# Patient Record
(demographics unavailable — no encounter records)

---

## 2024-10-31 NOTE — REVIEW OF SYSTEMS
[Negative] : Heme/Lymph [de-identified] : as per HPI  [de-identified] : as per HPI  [de-identified] : as per HPI

## 2024-10-31 NOTE — REASON FOR VISIT
[Initial Evaluation] : an initial evaluation for [Mother] : mother [[employer] ] : provided by [employer]   [FreeTextEntry2] : nasal congestion

## 2024-10-31 NOTE — HISTORY OF PRESENT ILLNESS
[No Personal or Family History of Easy Bruising, Bleeding, or Issues with General Anesthesia] : No Personal or Family History of easy bruising, bleeding, or issues with general anesthesia [de-identified] : 10-31-24 7 year old boy presents with nasal congestion and difficulty breathing for 2 months  History of Asthma- using albuterol PRN  Mom states patient gets sick frequently, when his congestion is bad he occasionally gets treated with prednisone  No currently using nasal sprays or steroids sprays  Reports intermittent mouth breathing and mild snoring when severely congested  No pauses, gasping or choking while sleeping  No bed wetting or sleep study conducted  (+) anterior rhinorrhea  Recent viral infection 3 weeks ago- treated with albuterol and prednisone.  Patient denies otalgia, otorrhea, ear infections, bleeding, hearing loss, ear surgeries, tinnitus, dizziness, vertigo, headaches related to hearing.  Denies epistaxis.  +snoring. unsure if apneas or pauses restless sleeper very tired during the day.  Current URI Previous left ear pit removal. still has one on the right.

## 2024-10-31 NOTE — ASSESSMENT
[FreeTextEntry1] : 7 year male Nasal congestion and but only  mild adenoid hypertrophy. Discussed medical vs surgical therapy  Discussed with parent on nasal saline/irrigations. If doing irrigations, recommend using distilled water and doing in shower twice a day at minimum. Use 0.9% and not hypertonic and slightly warm up to improve discomfort with irrigation. No other irrigation medications at this time. This will attempt to remove mucus and irritants/allergens.    Discussed at length regarding in home allergens and irritants. Avoiding smoke and pets, especially in the bedroom. Remove any carpeting in the bedroom and no stuffed animals.  Wash sheets in hot water once per week.  Hypoallergenic covers for bedding and pillows.  Consider HEPA filter.  Consider allergy referral.  Flonase trial for at least 3 weeks. Rx sent.  Discussed with the parent regarding sleep observation by going into their kids room a few times a week and watch them sleep for 5-10 min at varying times of the night to monitor snoring, apneas or gasping, signs of struggling to breath, restlessness, or other signs of SDB.  Sometimes we consider ordering a sleep study if highly concerned. Can discuss findings at next appointment.  OME today and wet cough. Likely viral.  Recheck at next visit.   Right pre-auricular pit.  Audio done.  Left previous ear pit removal.  Audio WNL  RTC 3-4 months

## 2024-10-31 NOTE — CONSULT LETTER
[Dear  ___] : Dear  [unfilled], [Consult Letter:] : I had the pleasure of evaluating your patient, [unfilled]. [Please see my note below.] : Please see my note below. [Consult Closing:] : Thank you very much for allowing me to participate in the care of this patient.  If you have any questions, please do not hesitate to contact me. [Sincerely,] : Sincerely, [FreeTextEntry2] : Marcell Berkowitz MD  [FreeTextEntry3] : Stalin Dumont MD  Pediatric Otolaryngology/ Head & Neck Surgery  [facility]  [address] [place] [address]  Tel [phone]  Fax [phone]

## 2024-10-31 NOTE — PHYSICAL EXAM
[2+] : 2+ [Normal Gait and Station] : normal gait and station [Normal muscle strength, symmetry and tone of facial, head and neck musculature] : normal muscle strength, symmetry and tone of facial, head and neck musculature [Normal] : no cervical lymphadenopathy [Exposed Vessel] : left anterior vessel not exposed [Wheezing] : no wheezing [Increased Work of Breathing] : no increased work of breathing with use of accessory muscles and retractions [FreeTextEntry6] : pre-auricular pit [FreeTextEntry7] : scar near previous ear pit.  [de-identified] : OME [de-identified] : wet cough [de-identified] : fearful, crying

## 2025-02-20 NOTE — REVIEW OF SYSTEMS
[Negative] : Heme/Lymph [de-identified] : as per HPI  [de-identified] : as per HPI  [de-identified] : as per HPI

## 2025-02-20 NOTE — REASON FOR VISIT
[Subsequent Evaluation] : a subsequent evaluation for [Mother] : mother [[employer] ] : provided by [employer]   [Nasal Obstruction] : nasal obstruction [Nasal Discharge] : nasal discharge [Sleep Apnea/ Snoring] : sleep apnea/ snoring [FreeTextEntry2] : nasal congestion

## 2025-02-20 NOTE — CONSULT LETTER
[Dear  ___] : Dear  [unfilled], [Consult Letter:] : I had the pleasure of evaluating your patient, [unfilled]. [Please see my note below.] : Please see my note below. [Consult Closing:] : Thank you very much for allowing me to participate in the care of this patient.  If you have any questions, please do not hesitate to contact me. [Sincerely,] : Sincerely, [FreeTextEntry2] : Marcell Berkowitz MD  [FreeTextEntry3] : Stalin Dumont MD  Pediatric Otolaryngology/ Head & Neck Surgery  [facility]  [address] [place] [address]  Tel [phone]  Fax [phone]

## 2025-02-20 NOTE — ASSESSMENT
[FreeTextEntry1] : 8 year M with continued nasal congestion with mild improvement on nasal steroid. Will plan to continue nasal steroid and begin nasal irrigation.   Referral for allergy given. Spoke to patient in their primary language of [place]akut (SAIMA Britton)  RTC 3 months

## 2025-02-20 NOTE — HISTORY OF PRESENT ILLNESS
[No Personal or Family History of Easy Bruising, Bleeding, or Issues with General Anesthesia] : No Personal or Family History of easy bruising, bleeding, or issues with general anesthesia [de-identified] : 2/20/25 8 year M with nasal congestion and SDB. Using nasal steroid with some relief but still has underlying congestion. Snores intermittently with choking "on his own saliva". No daytime tiredness, enuresis or focus issues. Overall mom feels he sleeps unless sick. No ear infections or speech concerns. No throat infections. Spoke to patient in their primary language of [place]ik.   10-31-24 7 year old boy presents with nasal congestion and difficulty breathing for 2 months  History of Asthma- using albuterol PRN  Mom states patient gets sick frequently, when his congestion is bad he occasionally gets treated with prednisone  No currently using nasal sprays or steroids sprays  Reports intermittent mouth breathing and mild snoring when severely congested  No pauses, gasping or choking while sleeping  No bed wetting or sleep study conducted  (+) anterior rhinorrhea  Recent viral infection 3 weeks ago- treated with albuterol and prednisone.  Patient denies otalgia, otorrhea, ear infections, bleeding, hearing loss, ear surgeries, tinnitus, dizziness, vertigo, headaches related to hearing.  Denies epistaxis.  +snoring. unsure if apneas or pauses restless sleeper very tired during the day.  Current URI Previous left ear pit removal. still has one on the right.

## 2025-05-09 NOTE — REVIEW OF SYSTEMS
[Negative] : Heme/Lymph [de-identified] : as per HPI  [de-identified] : as per HPI  [de-identified] : as per HPI

## 2025-05-09 NOTE — CONSULT LETTER
[Dear  ___] : Dear  [unfilled], [Consult Letter:] : I had the pleasure of evaluating your patient, [unfilled]. [Please see my note below.] : Please see my note below. [Consult Closing:] : Thank you very much for allowing me to participate in the care of this patient.  If you have any questions, please do not hesitate to contact me. [Sincerely,] : Sincerely, [FreeTextEntry2] : Marcell Berkowitz MD  [FreeTextEntry3] : Stalin Dumont MD  Pediatric Otolaryngology/ Head & Neck Surgery  [facility]  [address] [place] [address]  Tel [phone]  Fax [phone]

## 2025-05-09 NOTE — HISTORY OF PRESENT ILLNESS
[No Personal or Family History of Easy Bruising, Bleeding, or Issues with General Anesthesia] : No Personal or Family History of easy bruising, bleeding, or issues with general anesthesia [de-identified] : 5/9/25 Doing better, using Flonase intermit.  Still coughing and congested at night  +Mild snoring without apnea Occasional daytime fatigue and concentration problems in school  No recent ear or throat infections  Allergy appt pending   2/20/25 8 year M with nasal congestion and SDB. Using nasal steroid with some relief but still has underlying congestion. Snores intermittently with choking "on his own saliva". No daytime tiredness, enuresis or focus issues. Overall mom feels he sleeps unless sick. No ear infections or speech concerns. No throat infections. Spoke to patient in their primary language of Yi.   10-31-24 7 year old boy presents with nasal congestion and difficulty breathing for 2 months  History of Asthma- using albuterol PRN  Mom states patient gets sick frequently, when his congestion is bad he occasionally gets treated with prednisone  No currently using nasal sprays or steroids sprays  Reports intermittent mouth breathing and mild snoring when severely congested  No pauses, gasping or choking while sleeping  No bed wetting or sleep study conducted  (+) anterior rhinorrhea  Recent viral infection 3 weeks ago- treated with albuterol and prednisone.  Patient denies otalgia, otorrhea, ear infections, bleeding, hearing loss, ear surgeries, tinnitus, dizziness, vertigo, headaches related to hearing.  Denies epistaxis.  +snoring. unsure if apneas or pauses restless sleeper very tired during the day.  Current URI Previous left ear pit removal. still has one on the right.

## 2025-05-09 NOTE — PHYSICAL EXAM
[1+] : 1+ [Normal Gait and Station] : normal gait and station [Normal muscle strength, symmetry and tone of facial, head and neck musculature] : normal muscle strength, symmetry and tone of facial, head and neck musculature [Normal] : normal [Exposed Vessel] : left anterior vessel not exposed [Wheezing] : no wheezing [Increased Work of Breathing] : no increased work of breathing with use of accessory muscles and retractions [de-identified] : tonsil stones

## 2025-05-09 NOTE — ASSESSMENT
[FreeTextEntry1] : 8 year M with continued nasal congestion with mild improvement on nasal steroid. Will plan to continue nasal steroid and begin nasal irrigation.   Allergy referral still in place.  ATH on exam. ATH when present with chronic snoring and disrupted sleep always raises the for as yet undiagnosed obstructive sleep apnea (QUIN). Given that, I recommend sleep observation. Discussed with the parent regarding sleep observation by going into their kids room a few times a week and watch them sleep for 5-10 min at varying times of the night to monitor snoring, apneas or gasping, signs of struggling to breath, restlessness, or other signs of SDB.  Sometimes we consider ordering a sleep study if highly concerned.    Discussed snoring vs UARS vs QUIN.  Discussed that primary snoring is not harmful in and off itself but obstructive sleep apnea is different.  Often if we suspect SDB or QUIN, we recommend evaluating and treating due to long term risk of quality of life issues, learning issues and, in severe cases, heart and lung problems.  Pending the results, and if QUIN is suspected, and depending on severity, we discuss risks, alternatives, and benefits of surgical intervention such as adenotonsillectomy including alternatives of observation or CPAP.  Risks of adenotonsillectomy include, but are not limited to, bleeding, infection, scarring, voice changes, pain, dehydration, persistence of sleep apnea, and regrowth of adenoids.  RTC 2-3 months  A total of 30 minutes was spent on the encounter excluding separately billable services including but not limited to direct patient care, review and independent interpretation of prior records and testing, documentation, counseling patient/family, and coordination of care.

## 2025-05-09 NOTE — REASON FOR VISIT
[Subsequent Evaluation] : a subsequent evaluation for [Nasal Obstruction] : nasal obstruction [Nasal Discharge] : nasal discharge [Sleep Apnea/ Snoring] : sleep apnea/ snoring [Mother] : mother [[employer] ] : provided by [employer]   [FreeTextEntry2] : nasal congestion  [Interpreters_IDNumber] : [id number] [Interpreters_FullName] : Ashtyn [TWNoteComboBox1] : Panamanian